# Patient Record
(demographics unavailable — no encounter records)

---

## 2025-02-20 NOTE — HISTORY OF PRESENT ILLNESS
[FreeTextEntry1] : Mr. Varghese is a pleasant 63 year old gentleman with a PMH significant for morbid obesity, HTN, AF s/p ablation (PVI, PW and CTI 10/20/2020), post ablation had atrial tachycardia 2:1 with symptoms which then persisted despite dofetilide, also with right groin pain with negative US. He then underwent repeat ablation 12/30/2024 (AT from R lateral christopher), eustachian valve AT and re-isolation of CTI). He presents today for follow up.   He reports feeling well. His dofetilide was stopped after the last ablation. He reports taking eliquis and metoprolol without any missed doses. He denies palpitations, shortness of breath, dizziness. His right groin access site from last ablation is healing well without any hematoma. He does still have hip pain not just on the right side but nearly all major upper and lower extremity joints.   ECG 2/20/2025 : sinus rhythm at 74 bpm, QTc of 412ms.   TTE 08/2024:1. Left ventricular systolic function is normal with an ejection fraction visually estimated at 50 to 55 %. 2. Atrial fibrillation may limit accuracy of EF and regional wall motion assessment. 3. Atrial fibrillation with diastolic dysfunction and elevated left atrial filling pressures. 4. Left atrium is moderately dilated. 5. The right atrium is moderately dilated. 6. Mild left ventricular hypertrophy. 7. Mildly enlarged right ventricular cavity size and normal right ventricular systolic function. 8. Trace mitral regurgitation. 9. Mild tricuspid regurgitation. 10. Mild pulmonic regurgitation. 11. Trace aortic regurgitation. 12. Estimated pulmonary artery systolic pressure is 26 mmHg. 13. Compared to prior done on 12/28/2022, atrial fibrillation (known), mild LVH, and LONG.  RLE duplex US 11/13/2024: No evidence of right lower CFV DVT No fistula or pseudoaneurysm.

## 2025-02-20 NOTE — DISCUSSION/SUMMARY
[FreeTextEntry1] : Mr. Varghese is a pleasant 63 year old gentleman with a PMH significant for morbid obesity, HTN, AF s/p ablation (PVI, PW and CTI 10/20/2020), post ablation had atrial tachycardia 2:1 with symptoms which then persisted despite dofetilide, also with right groin pain with negative US. He then underwent repeat ablation 12/30/2024 (AT from R lateral christopher), eustachian valve AT and re-isolation of CTI). He presents today for follow up.   He has done well from arrhythmia [place]. He denies any complaints and is compliant with AC and metoprolol.   Plan: Continue Eliquis and metoprolol, refilled today Educated on weight loss and lifestyle modifications to alleviate cardiac risk  Follow up in clinic in 6 months or sooner if needed. All his questions were answered to his apparent satisfaction.